# Patient Record
Sex: MALE | Race: WHITE | Employment: OTHER | URBAN - METROPOLITAN AREA
[De-identification: names, ages, dates, MRNs, and addresses within clinical notes are randomized per-mention and may not be internally consistent; named-entity substitution may affect disease eponyms.]

---

## 2021-05-30 ENCOUNTER — HOSPITAL ENCOUNTER (EMERGENCY)
Age: 76
Discharge: LEFT BEFORE DISCHARGE | End: 2021-05-31
Attending: EMERGENCY MEDICINE
Payer: COMMERCIAL

## 2021-05-30 ENCOUNTER — APPOINTMENT (OUTPATIENT)
Dept: CT IMAGING | Age: 76
End: 2021-05-30
Payer: COMMERCIAL

## 2021-05-30 VITALS
WEIGHT: 201 LBS | BODY MASS INDEX: 26.64 KG/M2 | HEART RATE: 92 BPM | DIASTOLIC BLOOD PRESSURE: 82 MMHG | HEIGHT: 73 IN | OXYGEN SATURATION: 97 % | SYSTOLIC BLOOD PRESSURE: 164 MMHG | RESPIRATION RATE: 15 BRPM

## 2021-05-30 DIAGNOSIS — I62.9 INTRACRANIAL HEMORRHAGE (HCC): Primary | ICD-10-CM

## 2021-05-30 PROCEDURE — 99283 EMERGENCY DEPT VISIT LOW MDM: CPT

## 2021-05-30 PROCEDURE — 70450 CT HEAD/BRAIN W/O DYE: CPT

## 2021-05-30 PROCEDURE — 12032 INTMD RPR S/A/T/EXT 2.6-7.5: CPT

## 2021-05-30 RX ORDER — ATENOLOL 50 MG/1
50 TABLET ORAL DAILY
COMMUNITY

## 2021-05-30 RX ORDER — TAMSULOSIN HYDROCHLORIDE 0.4 MG/1
0.4 CAPSULE ORAL DAILY
COMMUNITY

## 2021-05-30 RX ORDER — METOPROLOL SUCCINATE 50 MG/1
50 TABLET, EXTENDED RELEASE ORAL DAILY
COMMUNITY

## 2021-05-30 ASSESSMENT — ENCOUNTER SYMPTOMS
SHORTNESS OF BREATH: 0
ABDOMINAL PAIN: 0
BACK PAIN: 0
VOMITING: 0
SORE THROAT: 0
DIARRHEA: 0
NAUSEA: 0

## 2021-05-30 NOTE — LETTER
[id number] [facility]  [address] [facility] [place] [address]  Phone: [phone]  Fax: [phone]    Patient: Rob Puri  YOB: 1945  Date: 5/31/2021 Time: 12:57 AM    Leaving the [facility] Advice    Chart #:[id number]    This will certify that I, the undersigned,    ______________________________________________________________________    A patient in the above named medical center, having requested discharge and removal from the medical center against the advice of my attending physician(s), hereby release the Emergency Department, its physicians, officers and employees, severally and individually, from any and all liability of any nature whatsoever for any injury or harm or complication of any kind that may result directly or indirectly, by reason of my terminating my stay as a patient from [facility], and hereby waive any and all rights of action I may now have or later acquire as a result of my voluntary departure from [facility] and the termination of my stay as a patient therein. This release is made with the full knowledge of the danger that may result from the action which I am taking.       Date:_______________________                         ___________________________                                                                                    Patient/Legal Representative    Witness:        ____________________________                          ___________________________  Nurse                                                                        Physician

## 2021-05-31 NOTE — ED PROVIDER NOTES
PHYSICAL EXAM     INITIAL VITALS:  height is 6' 1\" (1.854 m) and weight is 91.2 kg (201 lb). His blood pressure is 164/82 (abnormal) and his pulse is 92. His respiration is 15 and oxygen saturation is 97%. Physical Exam  Constitutional:       General: He is not in acute distress. Appearance: He is well-developed. HENT:      Head: Normocephalic. Comments: 2 cm vertical laceration to the right occipital region as shown on the picture. Otherwise unremarkable HEENT exam.     Right Ear: External ear normal.      Left Ear: External ear normal.   Eyes:      General: Lids are normal.         Right eye: No discharge. Left eye: No discharge. Neck:      Trachea: No tracheal deviation. Comments: Normal neck exam.  No tenderness. Cardiovascular:      Rate and Rhythm: Normal rate and regular rhythm. Pulmonary:      Effort: Pulmonary effort is normal.      Breath sounds: Normal breath sounds. Abdominal:      Palpations: Abdomen is soft. Tenderness: There is no abdominal tenderness. Musculoskeletal:      Comments: No significant musculoskeletal abnormalities. Normal back exam.   Skin:     General: Skin is warm and dry. Neurological:      Mental Status: He is alert. GCS: GCS eye subscore is 4. GCS verbal subscore is 5. GCS motor subscore is 6. Cranial Nerves: Cranial nerves are intact. Sensory: Sensation is intact. Motor: Motor function is intact. Coordination: Coordination is intact. Gait: Gait is intact. Comments: No focal neurologic deficits. Conversing normally. Ambulating normally. Psychiatric:         Behavior: Behavior normal.           DIFFERENTIAL DIAGNOSIS/ MDM:     Plan will be to image the head and repair the wound. Clinically he appears very well and otherwise nontoxic. Neurological intact.   This was a mechanical fall and not syncopal.    DIAGNOSTIC RESULTS     EKG: All EKG's are interpreted by the Emergency Department Physician who either signs or Co-signs this chart in the absence of a cardiologist.        RADIOLOGY:   Interpretation per the Radiologist below, if available at the time of this note:  CT HEAD WO CONTRAST   Final Result   The interhemispheric fissure and left tentorium are of greater density and   slightly thicker than normal suspicious for minimal acute subdural   hemorrhage. No parenchymal hemorrhage or intracranial mass effect. Findings were discussed with Li Iglesias at 12:11 am on 5/31/2021. No results found. LABS:  No results found for this visit on 05/30/21. EMERGENCY DEPARTMENT COURSE:     The patient was given the following medications:  No orders of the defined types were placed in this encounter. Vitals:    Vitals:    05/30/21 2324   BP: (!) 164/82   Pulse: 92   Resp: 15   SpO2: 97%   Weight: 91.2 kg (201 lb)   Height: 6' 1\" (1.854 m)     -------------------------  BP: (!) 164/82,  , Pulse: 92, Resp: 15      Re-evaluation Notes    I spoke with the radiologist who is concerned about an acute subdural hemorrhage. Clinically I feel this may be the case based on the patient's mechanism and scalp laceration. I discussed the results with the patient and his wife. They are from [place] and are reluctant to being transferred for urgent neurosurgical evaluation and observation. Myself and Derril Pointer the nurse had numerous discussions with the patient and his wife about the need for being transferred for neurosurgical evaluation. The patient and his wife are planning on returning to [place] tomorrow however now they wish to sign out AMA and drive to [place] [address] to go to hospital local to their residence. I did have a discussion with the patient regarding the risks of not getting an urgent neurosurgical evaluation and the need for close observation and neurologic checks.   We also discussed the risk of worsening hemorrhage and also risks of permanent morbidity and even mortality. We discussed the physiology of intracranial hemorrhage, midline shift/mass-effect and herniation through the foramen magnum and those consequences. The patient and his spouse both understand these risks and are willing to accept these risks. Clinically I do feel the patient has decisional capacity to understand and make this decision in conjunction with his wife who also I feel has decisional capacity. They know to call 911 for any changes in the patient's status during their trip back to [facility]. I also told him they could change their mind and return to us at any time and we will facilitate transfer. They understand this as well. No further events of the patient stay in our department. CONSULTS:    None    CRITICAL CARE:     None    PROCEDURES:    Lac Repair    Date/Time: 5/31/2021 12:00 AM  Performed by: Ara Ingram DO  Authorized by: Ara Ingram DO     Consent:     Consent obtained:  Verbal    Consent given by:  Patient and spouse    Risks discussed:  Infection and pain  Anesthesia (see MAR for exact dosages): Anesthesia method:  Local infiltration    Local anesthetic:  Lidocaine 1% WITH epi (3ml)  Laceration details:     Location:  Scalp    Scalp location:  Occipital    Length (cm):  6    Depth (mm):  6  Repair type:     Repair type: Intermediate  Exploration:     Wound exploration: wound explored through full range of motion and entire depth of wound probed and visualized    Treatment:     Area cleansed with:  Hibiclens    Amount of cleaning:  Extensive    Irrigation solution:  Sterile water    Irrigation method:  Syringe  Skin repair:     Repair method:  Staples    Number of staples:  5  Approximation:     Approximation:  Close  Post-procedure details:     Dressing:  Non-adherent dressing    Patient tolerance of procedure: Tolerated well, no immediate complications          FINAL IMPRESSION      1.  Intracranial hemorrhage (HCC)          DISPOSITION/PLAN DISPOSITION [place] 05/31/2021 01:04:05 AM      Condition on Disposition    Improved    PATIENT REFERRED TO:  Your doctor    Schedule an appointment as soon as possible for a visit today        DISCHARGE MEDICATIONS:  Discharge Medication List as of 5/31/2021  1:04 AM          (Please note that portions of this note were completed with a voice recognition program.  Efforts were made to edit the dictations but occasionally words are mis-transcribed.)    Estrella Plasencia DO, DO  Attending Emergency Physician       Estrella Plasencia DO  05/31/21 [address]  05/31/21 7634

## 2021-05-31 NOTE — ED NOTES
Pt ambulates to room- gate steady. Pt states he was carrying a box- attempting to remove from [place] back of vehicle-stooped over walking backwards he tripped and struck back of head on ground. Denies LOC.       Daisy Troy RN  05/30/21 0196